# Patient Record
(demographics unavailable — no encounter records)

---

## 2025-06-13 NOTE — HISTORY OF PRESENT ILLNESS
[de-identified] : 73M presenting with L nasal mass. Patient noticed several months of worsening left sided nasal congestion. Was given antibiotic and flonase without relief. Seen by Dr. Landis who noted a left nasal cavity mass. CT from [facility] reviewed showing L nasal mass and chronic sphenoid sinusitis.

## 2025-06-13 NOTE — PLAN
[TextEntry] : Biopsy taken today. WIll call with results.  Will plan for resection of this mass and limited sinus surgery on the left.   We have discussed at length the treatment options which include but are not limited to the following: observation, further medical therapy, and/or surgical intervention. Based on the face that observation and medical therapy has not resulted in resolution of symptoms and the patient has persistent sinusitis on both nasal endoscopy and radiographic imaging, the patient has decided to proceed with elective major surgery.   The procedure itself was discussed at length. The risks, benefits, and alternatives were explained in detail which include but are not limited to: anesthesia, pain, loss of smell/taste, bleeding, infection, need for nasal packing, double vision, vision loss, CSF leak requiring other procedures to repair, numbness of teeth and/or face, need for revision surgery, brain/skull base injury, a poor result, as well as unexpected/unanticipated risks. The patient understands these risks and is willing to proceed with surgery. All questions were answered.  PCP clearance

## 2025-06-13 NOTE — PROCEDURE
[FreeTextEntry3] : Informed consent was obtained. The mass was biopsied under direct endoscopic visualization with cup forceps. Hemostasis was achieved with surgicel. Tolerated well.  [de-identified] : andrew [de-identified] : Procedure: Bilateral nasal endoscopy (CPT 23590)   Indication: Anterior rhinoscopy was inadequate to evaluate pathology.  Left: Septum midline papillomatous appearing mass from SER  MM clear MT appears to be meghan   Right:  Septum midline Moderate inferior turbinate hypertrophy Middle meatus clear, without masses, polyps, or pus  Sphenoethmoidal recess clear, without masses, polyps, or pus

## 2025-07-18 NOTE — HISTORY OF PRESENT ILLNESS
[de-identified] : 73-year-old male with a PMHx of HTN, GERD, gout, BPH, and melanoma presents for pre-op evaluation for left maxillary antrostomy total sphenoidectomy and left nasal mass resection on 7/21/25 with Dr. Nolan at [facility]. Pt had PST done on 7/2/25. The patient denies fevers, chills, SOB, HELLER, chest pressure/pain, feeling lightheaded/dizzy, pre-syncopal, or syncopal. Reports hx of melanoma removal 10/24, cataract surgery 2019, and left foot surgery 9/16. Pt was a former smoker, quit 30 years ago, smoked 2 cigarettes a day. Denies any hx of anesthesia complications. Denies hx of MI/CVA/TIA. Denies hx of ischemic heart disease, hx of compensated or prior heart failure, hx of CVA, renal insufficiency, diabetes mellitus. Pt states that he is able to climb up 2 slights of stairs, without any problem.    used for HPI, ID #[id number] (Bebeto Combs)

## 2025-07-18 NOTE — REASON FOR VISIT
[Pre-Surgical Visit: ____] : pre-surgical visit for [unfilled] [Language Line ] : provided by Language Line   [FreeTextEntry2] : medical clearance [Interpreters_IDNumber] : [id number] [Interpreters_FullName] : Bebeto Combs [TWNoteComboBox1] : [place]riot

## 2025-07-18 NOTE — PLAN
[TextEntry] : #1 Nasal mass, chronic sphenoidal sinusitis - The RCRI class is: 0 points, 0.5% risk of major cardiac event - Ridley Marli-operative Cardiac Risk Score is: 0.2% risk of MI or cardiac arrest, intraoperatively or up to 30 days post-op - METS >4 - There are no absolute contraindications to the proposed surgical procedure. The patient is in optimal condition for surgery and may proceed with surgery, PENDING STRESS TEST. Pre-operative test results have been reviewed. Labs are essentially normal. EKG is unchanged from prior, showing sinus bradycardia at 55 bpm. Echo reviewed, essentially normal.  - No further pre-operative testing is medically indicated - Patient is to remain NPO after midnight. Patient may take the following medications the morning of surgery: Omeprazole, Allopurinol, and Tamsulosin, at least 2 hours prior to start time with small sips of water. Advised pt to NOT take Lisinopril day of surgery, only the day prior. Unless otherwise instructed by prescriber/surgeon. - The patient is to stop all antiplatelets, anticoagulants, NSAIDs, vitamins, and OTC supplements one week prior to surgery